# Patient Record
Sex: FEMALE | Race: WHITE
[De-identification: names, ages, dates, MRNs, and addresses within clinical notes are randomized per-mention and may not be internally consistent; named-entity substitution may affect disease eponyms.]

---

## 2019-04-15 NOTE — PREOPHP
DATE OF ADMISSION: 04/16/2019

 

HISTORY OF PRESENT ILLNESS:  This 58-year-old patient is admitted for elective cataract surgery of th
e left eye.  The patient has complained of decreased vision in the left eye for the past years' time 
without prior history of eye disease or injury.  The patient does have a history of insulin-dependent
 diabetes mellitus for many years, as well as being treated for systemic hypertension.

 

CURRENT MEDICATIONS INCLUDE:

1.  Low dose aspirin (discontinued 1 week prior to surgery.

2.  Hydrochlorothiazide.

3.  Janumet.

4.  Lipitor.

5.  Losartan.

6.  Novolin.

 

ALLERGIES:  There are no known allergies to medication.

 

PHYSICAL EXAMINATION:  A best corrected visual acuity 20/50 in the right eye and 20/200 in the left e
ye.  Slit lamp examination reveals trace posterior subcapsular cataract in the right eye and an anter
ior cortical and dense posterior subcapsular cataract in the left eye.  Applanation tonometry is 15 m
mHg.  Examination of the retina does not reveal the presence of any diabetic retinopathy.

 

DIAGNOSIS:  Cortical and posterior subcapsular cataract, left eye.

 

PLAN:  Cataract extraction with lens implant, left eye.  The risks and alternatives to the surgery ha
ve been discussed with the patient as well as the hope for improvement of visual acuity leading to gr
eater ability to perform activities of daily living.  The patient understands this and agrees to proc
eed with surgery.

 

 

Dictated By: ROXANN DIAL/FERNANDO

DD:    04/15/2019 12:24:13

DT:    04/15/2019 12:51:52

Conf#: [id number]

DID#:  [id number]

## 2019-04-16 ENCOUNTER — HOSPITAL ENCOUNTER (OUTPATIENT)
Dept: HOSPITAL 91 - SDS | Age: 59
Discharge: HOME | End: 2019-04-16
Payer: COMMERCIAL

## 2019-04-16 ENCOUNTER — HOSPITAL ENCOUNTER (OUTPATIENT)
Dept: HOSPITAL 10 - SDS | Age: 59
Discharge: HOME | End: 2019-04-16
Attending: OPHTHALMOLOGY
Payer: COMMERCIAL

## 2019-04-16 VITALS — SYSTOLIC BLOOD PRESSURE: 136 MMHG | RESPIRATION RATE: 16 BRPM | DIASTOLIC BLOOD PRESSURE: 63 MMHG | HEART RATE: 84 BPM

## 2019-04-16 VITALS — HEART RATE: 80 BPM | SYSTOLIC BLOOD PRESSURE: 133 MMHG | RESPIRATION RATE: 21 BRPM | DIASTOLIC BLOOD PRESSURE: 62 MMHG

## 2019-04-16 VITALS — SYSTOLIC BLOOD PRESSURE: 121 MMHG | RESPIRATION RATE: 19 BRPM | HEART RATE: 80 BPM | DIASTOLIC BLOOD PRESSURE: 59 MMHG

## 2019-04-16 VITALS — SYSTOLIC BLOOD PRESSURE: 124 MMHG | RESPIRATION RATE: 22 BRPM | DIASTOLIC BLOOD PRESSURE: 62 MMHG | HEART RATE: 82 BPM

## 2019-04-16 VITALS — DIASTOLIC BLOOD PRESSURE: 56 MMHG | RESPIRATION RATE: 24 BRPM | HEART RATE: 80 BPM | SYSTOLIC BLOOD PRESSURE: 119 MMHG

## 2019-04-16 VITALS — RESPIRATION RATE: 20 BRPM | HEART RATE: 80 BPM | SYSTOLIC BLOOD PRESSURE: 120 MMHG | DIASTOLIC BLOOD PRESSURE: 61 MMHG

## 2019-04-16 VITALS
BODY MASS INDEX: 32.54 KG/M2 | WEIGHT: 176.81 LBS | HEIGHT: 62 IN | BODY MASS INDEX: 32.54 KG/M2 | HEIGHT: 62 IN | WEIGHT: 176.81 LBS

## 2019-04-16 VITALS — HEART RATE: 81 BPM | SYSTOLIC BLOOD PRESSURE: 135 MMHG | DIASTOLIC BLOOD PRESSURE: 60 MMHG | RESPIRATION RATE: 18 BRPM

## 2019-04-16 VITALS — SYSTOLIC BLOOD PRESSURE: 126 MMHG | RESPIRATION RATE: 16 BRPM | DIASTOLIC BLOOD PRESSURE: 59 MMHG | HEART RATE: 82 BPM

## 2019-04-16 VITALS — HEART RATE: 80 BPM | SYSTOLIC BLOOD PRESSURE: 147 MMHG | DIASTOLIC BLOOD PRESSURE: 79 MMHG | RESPIRATION RATE: 16 BRPM

## 2019-04-16 VITALS — SYSTOLIC BLOOD PRESSURE: 118 MMHG | RESPIRATION RATE: 29 BRPM | DIASTOLIC BLOOD PRESSURE: 59 MMHG | HEART RATE: 82 BPM

## 2019-04-16 DIAGNOSIS — H25.042: Primary | ICD-10-CM

## 2019-04-16 DIAGNOSIS — Z79.82: ICD-10-CM

## 2019-04-16 DIAGNOSIS — Z79.84: ICD-10-CM

## 2019-04-16 DIAGNOSIS — E11.9: ICD-10-CM

## 2019-04-16 DIAGNOSIS — Z79.4: ICD-10-CM

## 2019-04-16 DIAGNOSIS — I10: ICD-10-CM

## 2019-04-16 PROCEDURE — 82962 GLUCOSE BLOOD TEST: CPT

## 2019-04-16 PROCEDURE — 66984 XCAPSL CTRC RMVL W/O ECP: CPT

## 2019-04-16 PROCEDURE — V2632 POST CHMBR INTRAOCULAR LENS: HCPCS

## 2019-04-16 RX ADMIN — DEXAMETHASONE SODIUM PHOSPHATE 1 MG: 4 INJECTION, SOLUTION INTRAMUSCULAR; INTRAVENOUS at 08:05

## 2019-04-16 RX ADMIN — LIDOCAINE HYDROCHLORIDE 1 ML: 40 INJECTION, SOLUTION RETROBULBAR; TOPICAL at 08:06

## 2019-04-16 RX ADMIN — CYCLOPENTOLATE HYDROCHLORIDE AND PHENYLEPHRINE HYDROCHLORIDE 1 DROP: 2; 10 SOLUTION/ DROPS OPHTHALMIC at 06:13

## 2019-04-16 RX ADMIN — THIAMINE HYDROCHLORIDE 1 MLS/HR: 100 INJECTION, SOLUTION INTRAMUSCULAR; INTRAVENOUS at 06:19

## 2019-04-16 RX ADMIN — DICLOFENAC SODIUM 1 DROP: 1 SOLUTION OPHTHALMIC at 06:18

## 2019-04-16 RX ADMIN — CARBACHOL 1 ML: 0.1 SOLUTION OPHTHALMIC at 08:05

## 2019-04-16 RX ADMIN — TETRACAINE HYDROCHLORIDE 1 DROP: 5 SOLUTION OPHTHALMIC at 08:06

## 2019-04-16 RX ADMIN — MOXIFLOXACIN HYDROCHLORIDE 1 DROP: 5 SOLUTION/ DROPS OPHTHALMIC at 06:16

## 2019-04-16 RX ADMIN — TROPICAMIDE 1 DROP: 10 SOLUTION/ DROPS OPHTHALMIC at 06:15

## 2019-04-16 RX ADMIN — Medication 1 EA: at 08:51

## 2019-04-16 NOTE — PAC
Date/Time of Note


Date/Time of Note


DATE: 4/16/19 


TIME: 08:55





Post-Anesthesia Notes


Post-Anesthesia Note


Last documented vital signs





Vital Signs


  Date      Temp  Pulse  Resp  B/P (MAP)   Pulse Ox  O2          O2 Flow    FiO2


Time                                                 Delivery    Rate


   4/16/19  98.3


     08:49


   4/16/19           80    20      120/61        96  Room Air


     08:31                           (80)





Activity:  WNL


Respiratory function:  WNL


Cardiovascular function:  WNL


Mental status:  Baseline


Pain reasonably controlled:  Yes


Hydration appropriate:  Yes


Nausea/Vomiting absent:  Yes











ROHIT CALLAHAN                  Apr 16, 2019 08:55

## 2019-04-16 NOTE — SIPON
Date/Time of Note


Date/Time of Note


DATE: 4/16/19 


TIME: 08:14





Operative Report


Preoperative Diagnosis


Posterior subcapsular cataract os


Postoperative Diagnosis


same


Operation/Procedure Performed


cataract extraction with lens implant os


Surgeon


roxann sabillon


First assist


none


Anesthesia:  MAC


Estimated blood loss:  none


Transfusion Required


   none


Specimen


none


Grafts/Implants


posterior chamber intraocular lens


Complications


none











ROXANN SABILLON MD            Apr 16, 2019 08:15

## 2019-04-16 NOTE — OPR
DATE OF OPERATION:  04/16/2019

 

 

PREOPERATIVE DIAGNOSIS:  Posterior subcapsular cataract.

 

POSTOPERATIVE DIAGNOSIS:  Posterior subcapsular cataract.

 

OPERATION PERFORMED:  Cataract extraction with lens implant, left eye.

 

SURGEON:  Roxann Chacon MD

 

ANESTHESIA:  Fly Gaines CRNA.  

 

PREOPERATIVE DIAGNOSIS:  Cataract, left eye.

 

POSTOPERATIVE DIAGNOSIS:  Cataract, left eye.

 

SURGEON:  Roxann Chacon MD

 

OPERATION:  Phacoemulsification with posterior chamber intraocular lens implant, left eye.

 

PROCEDURE:  The patient was brought to the operating room and placed on the table with an IV in place
 and the patient attached to an EKG monitor. Oxygen was given via face mask.

 

After some intravenous sedation was administered, local anesthesia was given using Xylocaine 2% with 
epinephrine, mixed with Marcaine 0.5%. This was given in a lid block and retrobulbar injection. The p
atient was then prepped and draped in the usual sterile manner.

 

A wire lid speculum was inserted between the lids of the left eye. A Superblade was used to enter the
 anterior chamber at the corneoscleral limbus at the 10:30 o'clock position. A separate incision was 
made using a 3.0-mm keratome which entered the corneoscleral junction at the 12 o'clock position. Thr
ough this 3-mm opening, an irrigating cystotome was introduced into the anterior chamber. The chamber
 was filled with Viscoat and an anterior capsulotomy was performed. Balanced salt solution was then u
sed for hydrodissection of the lens.  A phacoemulsification handpiece was then brought into the field
 and introduced into the anterior chamber. The lens nucleus was emulsified using a deep groove and cr
acking the nucleus into quadrants. Following this, each quadrant was aspirated and emulsified at the 
pupillary margin.

 

After this was completed, the irrigation/aspiration handpiece was brought to the field, introduced in
to the posterior chamber, and the lens cortical material was removed. When this was completed, additi
onal Viscoat was injected into the anterior and posterior chambers.

 

The 3-mm opening had its internal lips enlarged, and then the posterior chamber intraocular lens nicolle
uring 19.5 diopters ([employer] Model LI61AO) was then injected into the posterior c
hamber using the lens injector system. After the leading haptic was introduced into the capsular bag 
and the lens optic was present in the center of the eye, the injector was removed and the trailing ha
ptic was grasped with non-toothed forceps and introduced into the capsular fold superiorly. A Sinskey
 hook was then used to rotate the intraocular lens so that the lips were oriented in the horizontal m
eridian. One 10-0 nylon suture was placed across the wound.

 

Prior to tying, the irrigation/aspiration handpiece was reintroduced into the anterior chamber to rem
ove the Viscoat. Miochol was instilled to constrict the pupil, and then the 10-0 nylon suture was tie
d. The ends were cut short and then the knot was buried.

 

Then, 0.5 mL of dexamethasone and 0.5 mL of Ancef were injected into the sub-Tenon space in the infer
ior fornix.  Ciloxan drops were then placed on the surface of the eye. The speculum was removed and a
 patch was applied.

 

The patient then left the operating room in satisfactory condition.

 

 

Dictated By: ROXANN DIAL/FERNANDO

DD:    04/16/2019 08:10:23

DT:    04/16/2019 12:27:41

Conf#: [id number]

DID#:  [id number]

## 2019-04-16 NOTE — PREAC
Date/Time of Note


Date/Time of Note


DATE: 4/16/19 


TIME: 06:58





Anesthesia Eval and Record


Evaluation


Time Pre-Procedure Interview


DATE: 4/16/19 


TIME: 06:58


Age


58


Sex


female


NPO:  8 hrs


Preoperative diagnosis


left cataract


Planned procedure


left eye CEIOL implant





Past Medical History


Past Medical History:  Includes


Cardio:  HTN, Dyslipidemia


Endo:  Diabetes





Surgery & Anesthesia Issues


No known issue





Meds


Anticoagulation:  No


Beta Blocker within 24 hr:  No


Reason Beta Blocker not given:  Pt. not on B-Blocker


Reported Medications


Losartan Potassium* (Losartan Potassium*) 50 Mg Tablet, 50 MG PO DAILY, TAB


   4/16/19


Hydrochlorothiazide* (Hydrochlorothiazide*) 12.5 Mg Tablet, 12.5 MG PO DAILY, 


#30 TAB


   4/16/19


Aspirin* (Aspirin* EC) 81 Mg Tablet.dr, 81 MG PO DAILY, TAB


   4/16/19


Insulin Aspart* (Novolog Insulin Pen*) 100 Unit/Ml Soln, 5 UNIT SC TIDM A, EA


   4/16/19


Insulin Glargine,Hum.rec.anlog (Basaglar Kwikpen U-100) 100 Unit/1 Ml Ins


uln.pen, 10 UNIT SC QHS, EA


   4/16/19


[Lantus 50 Daily]   No Conflict Check


   12/1/17


[Novolin 5U Daily]   No Conflict Check


   12/1/17


[Lisinopril Daily]   No Conflict Check


   12/1/17


[Metformin Daily]   No Conflict Check


   12/1/17





Current Medications


Diclofenac Sodium (Voltaren 0.1%) 1 drop Q5 MIN X 3 OPER  Last administered on 


4/16/19at 06:18; Admin Dose 1 DROP;  Start 4/16/19 at 06:00


Tropicamide (Mydriacyl 1%) 1 drop Q5 MIN X3 OPER  Last administered on 4/16/19at


06:15; Admin Dose 1 DROP;  Start 4/16/19 at 06:00


Moxifloxacin HCl (Vigamox) 1 drop Q5 MIN X 3 OPER  Last administered on 


4/16/19at 06:16; Admin Dose 1 DROP;  Start 4/16/19 at 06:00


Cyclopentolate/ Phenylephrine (Cyclomydril Oph 2 ml) 1 drop Q5 MIN X 3 OPER  


Last administered on 4/16/19at 06:13; Admin Dose 1 DROP;  Start 4/16/19 at 06:00


Sodium Chloride 1,000 ml @  25 mls/hr Q24H IV  Last administered on 4/16/19at 


06:19; Admin Dose 25 MLS/HR;  Start 4/16/19 at 06:00


Meds reviewed:  Yes





Allergies


Coded Allergies:  


     No Known Drug Allergies (Verified  Allergy, Unknown, 4/16/19)


Allergies Reviewed:  Yes





Labs/Studies


Labs Reviewed:  Reviewed by anesthesiologist


Pregnancy test:  N/A


Studies:  ECG, CXR





Pre-procedure Exam


Last vitals





Vital Signs


  Date      Temp  Pulse  Resp  B/P (MAP)   Pulse Ox  O2          O2 Flow    FiO2


Time                                                 Delivery    Rate


   4/16/19  96.4     80    16      147/79        97  Room Air


     06:34                          (101)





Airway:  Adequate mouth opening, Adequate thyromental dist


Mallampati:  Mallampati II


Teeth:  Normal


Lung:  Normal


Heart:  Normal





ASA Physical Status


ASA physical status:  2


Emergency:  None





Planned Anesthetic


General/MAC:  MAC





Planned Pain Management


Parenteral pain med, Local by surgeon





Pre-operative Attestations


Prior to commencing anesthesia and surgery, the patient was re-evaluated, there 


was verification of:


*The patient's identity


*The results of appropriate recent lab work and preoperative vital signs


*The above evaluation not changing prior to induction


*Anesthetic plan, risk benefits, alternative and complications discussed with 


patient/family; questions answered; patient/family understands, accepts and 


wishes to proceed.











ROHIT CALLAHAN                  Apr 16, 2019 06:59

## 2019-06-03 NOTE — PREOPHP
DATE OF ADMISSION: 06/04/2019

 

HISTORY OF PRESENT ILLNESS:  This 58-year-old patient is admitted for elective cataract surgery of th
e right eye.  The patient previously underwent cataract surgery on the left eye 2 months prior with e
xcellent visual recovery.  The patient has a history of insulin-dependent diabetes mellitus for many 
years as well as being treated for systemic hypertension.

 

CURRENT MEDICATIONS:  Include:

1.  Low dose aspirin, which was discontinued 1 week prior to surgery.

2.  Basaglar.

3.  Hydrochlorothiazide.

4.  Janumet.

5.  Losartan.

6.  Novolin insulin.

 

ALLERGIES:  THE THERE ARE NO KNOWN ALLERGIES.

 

PHYSICAL EXAMINATION:  The visual acuity with correction is 20/70 in the right eye and 20/40 in the l
eft eye.  Slit lamp examination reveals posterior subcapsular cataract in the right eye and a posteri
or chamber intraocular lens in the left eye.  Applanation tonometry is 15 mmHg in the right eye.  The
re is a posterior chamber intraocular lens in appropriate position in the left eye.  Examination of t
he retina does not reveal the presence of any diabetic retinopathy.

 

DIAGNOSIS:  Posterior subcapsular cataract, right eye.

 

PLAN:  Cataract extraction with lens implant, right eye.  The risks and alternatives to the surgery h
ave been discussed with the patient as well as the hope for improvement of visual acuity leading to g
reater ability to perform activities of daily living.  The patient understands this and agrees to pro
ceed with surgery.

 

 

Dictated By: ROXANN DIAL/FERNANDO

DD:    06/03/2019 12:58:08

DT:    06/03/2019 14:52:24

Conf#: [id number]

DID#:  [id number]

## 2019-06-04 ENCOUNTER — HOSPITAL ENCOUNTER (OUTPATIENT)
Dept: HOSPITAL 91 - SDS | Age: 59
Discharge: HOME | End: 2019-06-04
Payer: COMMERCIAL

## 2019-06-04 ENCOUNTER — HOSPITAL ENCOUNTER (OUTPATIENT)
Dept: HOSPITAL 10 - SDS | Age: 59
Discharge: HOME | End: 2019-06-04
Attending: OPHTHALMOLOGY
Payer: COMMERCIAL

## 2019-06-04 VITALS — SYSTOLIC BLOOD PRESSURE: 134 MMHG | RESPIRATION RATE: 14 BRPM | HEART RATE: 70 BPM | DIASTOLIC BLOOD PRESSURE: 74 MMHG

## 2019-06-04 VITALS
HEIGHT: 60 IN | BODY MASS INDEX: 34.5 KG/M2 | WEIGHT: 175.71 LBS | WEIGHT: 175.71 LBS | HEIGHT: 60 IN | BODY MASS INDEX: 34.5 KG/M2

## 2019-06-04 VITALS — HEART RATE: 80 BPM | SYSTOLIC BLOOD PRESSURE: 127 MMHG | RESPIRATION RATE: 16 BRPM | DIASTOLIC BLOOD PRESSURE: 74 MMHG

## 2019-06-04 VITALS — HEART RATE: 78 BPM | SYSTOLIC BLOOD PRESSURE: 139 MMHG | RESPIRATION RATE: 18 BRPM | DIASTOLIC BLOOD PRESSURE: 78 MMHG

## 2019-06-04 VITALS — HEART RATE: 72 BPM | RESPIRATION RATE: 14 BRPM | SYSTOLIC BLOOD PRESSURE: 117 MMHG | DIASTOLIC BLOOD PRESSURE: 72 MMHG

## 2019-06-04 VITALS — HEART RATE: 74 BPM | SYSTOLIC BLOOD PRESSURE: 118 MMHG | RESPIRATION RATE: 13 BRPM | DIASTOLIC BLOOD PRESSURE: 71 MMHG

## 2019-06-04 VITALS — SYSTOLIC BLOOD PRESSURE: 139 MMHG | HEART RATE: 77 BPM | DIASTOLIC BLOOD PRESSURE: 67 MMHG | RESPIRATION RATE: 16 BRPM

## 2019-06-04 VITALS — HEART RATE: 74 BPM | SYSTOLIC BLOOD PRESSURE: 126 MMHG | DIASTOLIC BLOOD PRESSURE: 75 MMHG | RESPIRATION RATE: 14 BRPM

## 2019-06-04 VITALS — DIASTOLIC BLOOD PRESSURE: 63 MMHG | SYSTOLIC BLOOD PRESSURE: 137 MMHG | RESPIRATION RATE: 16 BRPM | HEART RATE: 77 BPM

## 2019-06-04 DIAGNOSIS — Z79.4: ICD-10-CM

## 2019-06-04 DIAGNOSIS — E78.5: ICD-10-CM

## 2019-06-04 DIAGNOSIS — H25.11: Primary | ICD-10-CM

## 2019-06-04 DIAGNOSIS — E11.9: ICD-10-CM

## 2019-06-04 DIAGNOSIS — Z79.82: ICD-10-CM

## 2019-06-04 DIAGNOSIS — I10: ICD-10-CM

## 2019-06-04 PROCEDURE — 66984 XCAPSL CTRC RMVL W/O ECP: CPT

## 2019-06-04 PROCEDURE — 93005 ELECTROCARDIOGRAM TRACING: CPT

## 2019-06-04 PROCEDURE — 82962 GLUCOSE BLOOD TEST: CPT

## 2019-06-04 PROCEDURE — V2632 POST CHMBR INTRAOCULAR LENS: HCPCS

## 2019-06-04 RX ADMIN — MOXIFLOXACIN HYDROCHLORIDE 1 DROP: 5 SOLUTION/ DROPS OPHTHALMIC at 07:43

## 2019-06-04 RX ADMIN — DEXAMETHASONE SODIUM PHOSPHATE 1 MG: 4 INJECTION, SOLUTION INTRAMUSCULAR; INTRAVENOUS at 08:19

## 2019-06-04 RX ADMIN — CYCLOPENTOLATE HYDROCHLORIDE AND PHENYLEPHRINE HYDROCHLORIDE 1 DROP: 2; 10 SOLUTION/ DROPS OPHTHALMIC at 07:42

## 2019-06-04 RX ADMIN — TROPICAMIDE 1 DROP: 10 SOLUTION/ DROPS OPHTHALMIC at 07:43

## 2019-06-04 RX ADMIN — CARBACHOL 1 ML: 0.1 SOLUTION OPHTHALMIC at 08:19

## 2019-06-04 RX ADMIN — CEFAZOLIN 1 GM: 1 INJECTION, POWDER, FOR SOLUTION INTRAMUSCULAR; INTRAVENOUS at 08:19

## 2019-06-04 RX ADMIN — DICLOFENAC SODIUM 1 DROP: 1 SOLUTION OPHTHALMIC at 07:43

## 2019-06-04 RX ADMIN — THIAMINE HYDROCHLORIDE 1 MLS/HR: 100 INJECTION, SOLUTION INTRAMUSCULAR; INTRAVENOUS at 07:43

## 2019-06-04 NOTE — OPR
DATE OF OPERATION:  06/04/2019

 

 

PREOPERATIVE DIAGNOSES:  Nuclear sclerotic, core and cortical cataract, right 

eye.

 

POSTOPERATIVE DIAGNOSIS:  Nuclear sclerotic, core and cortical cataract, right 

eye.

 

OPERATION PERFORMED:  Cataract extraction with lens implant, right eye.

 

SURGEON:  Roxann Sabillon M.D.

 

ANESTHESIA:  Local standby.

 

PROCEDURE:  The patient was brought to the operating room and placed on the 

table with an IV in place and the patient attached to an EKG monitor.  Oxygen 

was given via face mask.

 

After some intravenous sedation was administered, local anesthesia was given 

using Xylocaine 2% with epinephrine, mixed with Marcaine 0.5%.  This was given 

in a lid block and retrobulbar injection.  The patient was then prepped and 

draped in the usual sterile manner.

 

A wire lid speculum was inserted between the lids of the right eye.  A 

Superblade was used to enter the anterior chamber at the corneoscleral limbus at

the 10:30 o'clock position.  A separate incision was made using a 3.0-mm 

keratome which entered the corneoscleral junction at the 12 o'clock position.  

Through this 3-mm opening, an irrigating cystotome was introduced into the 

anterior chamber.  The chamber was filled with Viscoat and an anterior 

capsulotomy was performed.  Balanced salt solution was then used for 

hydrodissection of the lens.  A phacoemulsification handpiece was then brought 

into the field and introduced into the anterior chamber.  The lens nucleus was 

emulsified using a deep groove and cracking the nucleus into quadrants.  

Following this, each quadrant was aspirated and emulsified at the pupillary 

margin.

 

After this was completed, the irrigation/aspiration handpiece was brought to the

field, introduced into the posterior chamber, and the lens cortical material was

removed.  When this was completed, additional Viscoat was injected into the 

anterior and posterior chambers. As an attempt was being made to remove the sub-

incisional cortex using bimanual technique of the lens.  The posterior lens 

capsule tore and some formed vitreous presented at the wound.  A limited 

anterior vitrectomy was performed until no vitreous was present and sufficient 

capsular support was noted to be present. 

 

The 3-mm opening had its internal lips enlarged, and then the posterior chamber 

intraocular lens measuring 19.0 diopters ([employer] Model 

LI61AO) was then injected into the posterior chamber using the lens injector 

system.  After the leading haptic was introduced into the capsular bag and the 

lens optic was present in the center of the eye, the injector was removed and 

the trailing haptic was grasped with non-toothed forceps and introduced into the

capsular fold superiorly.  A Sinskey hook was then used to rotate the 

intraocular lens so that the lips were oriented in the horizontal meridian.  One

10-0 nylon suture was placed across the wound.

 

Prior to tying, the irrigation/aspiration handpiece was reintroduced into the 

anterior chamber to remove the Viscoat.  Miochol was instilled to constrict the 

pupil, and then the 10-0 nylon suture was tied.  The ends were cut short and 

then the knot was buried.

 

Then, 0.5 mL of dexamethasone and 0.5 mL of Ancef were injected into the sub-

Tenon space in the inferior fornix.  Ciloxan drops were then placed on the 

surface of the eye.  The speculum was removed and a patch was applied.

 

The patient then left the operating room in satisfactory condition.

 

 

Dictated By: ROXANN DIAL/FERNANDO

DD:    06/04/2019 09:18:02

DT:    06/04/2019 10:21:28

Conf#: [id number]

DID#:  [id number]

CC: ROXANN SABILLON MD;*EndCC*

MTDD

## 2019-06-04 NOTE — SIPON
Date/Time of Note


Date/Time of Note


DATE: 6/4/19 


TIME: 09:23





Operative Report


Preoperative Diagnosis


cortical and nuclear sclerotic cataract od


Postoperative Diagnosis


same


Operation/Procedure Performed


cataract extraction with lens implant od


Surgeon


roxann sabillon


First assist


none


Anesthesia:  MAC


Estimated blood loss:  none


Transfusion Required


   none


Specimen


none


Grafts/Implants


posterior chamber lens implant


Complications


none











ROXANN SABILLON MD             Jun 4, 2019 09:25

## 2019-06-04 NOTE — PREAC
Date/Time of Note


Date/Time of Note


DATE: 6/4/19 


TIME: 08:31





Anesthesia Eval and Record


Evaluation


Time Pre-Procedure Interview


DATE: 6/4/19 


TIME: 08:31


Age


58


Sex


female


NPO:  8 hrs


Preoperative diagnosis


Rt eye cataract


Planned procedure


Rt eye CE IOL implant





Past Medical History


Past Medical History:  Includes


Cardio:  HTN, Dyslipidemia


Endo:  Diabetes


GI:  Morbid obesity





Surgery & Anesthesia Issues


No known issue





Meds


Anticoagulation:  Yes


Beta Blocker within 24 hr:  No


Reason Beta Blocker not given:  Pt. not on B-Blocker


Reported Medications


Atorvastatin* (Atorvastatin*) 40 Mg Tablet, 40 MG PO QHS, #30 TAB


   4/16/19


Sitagliptin Phos/Metformin HCl (Janumet 50-1,000 mg Tablet) 1 Each Tablet, 1 


EACH PO DAILY, TAB


   4/16/19


Losartan Potassium* (Losartan Potassium*) 50 Mg Tablet, 50 MG PO DAILY, TAB


   4/16/19


Hydrochlorothiazide* (Hydrochlorothiazide*) 12.5 Mg Tablet, 12.5 MG PO DAILY, 


#30 TAB


   4/16/19


Aspirin* (Aspirin* EC) 81 Mg Tablet.dr, 81 MG PO DAILY, TAB


   4/16/19


Insulin Aspart* (Novolog Insulin Pen*) 100 Unit/Ml Soln, 5 UNIT SC TIDM A, EA


   4/16/19


Insulin Glargine,Hum.rec.anlog (Basaglar Kwikpen U-100) 100 Unit/1 Ml 


Insuln.pen, 10 UNIT SC QHS, EA


   4/16/19





Current Medications


Diclofenac Sodium (Voltaren 0.1%) 1 drop Q5 MIN X 3 OPER  Last administered on 


6/4/19at 07:43; Admin Dose 1 DROP;  Start 6/4/19 at 07:00


Tropicamide (Mydriacyl 1%) 1 drop Q5 MIN X3 OPER  Last administered on 6/4/19at 


07:43; Admin Dose 1 DROP;  Start 6/4/19 at 07:00


Moxifloxacin HCl (Vigamox) 1 drop Q5 MIN X 3 OPER  Last administered on 6/4/19at


07:43; Admin Dose 1 DROP;  Start 6/4/19 at 07:00


Cyclopentolate/ Phenylephrine (Cyclomydril Oph 2 ml) 1 drop Q5 MIN X 3 OPER  


Last administered on 6/4/19at 07:42; Admin Dose 1 DROP;  Start 6/4/19 at 07:00


Sodium Chloride 1,000 ml @  25 mls/hr Q24H IV  Last administered on 6/4/19at 


07:43; Admin Dose 25 MLS/HR;  Start 6/4/19 at 07:00


Meds reviewed:  Yes





Allergies


Coded Allergies:  


     No Known Drug Allergies (Verified  Allergy, Unknown, 6/4/19)


Allergies Reviewed:  Yes





Labs/Studies


Labs Reviewed:  Reviewed by anesthesiologist


Pregnancy test:  N/A


Studies:  ECG





Pre-procedure Exam


Last vitals





Vital Signs


  Date      Temp  Pulse  Resp  B/P (MAP)   Pulse Ox  O2          O2 Flow    FiO2


Time                                                 Delivery    Rate


    6/4/19  98.0     77    16      139/67        98  Room Air


     06:55                           (91)





Airway:  Adequate mouth opening, Adequate thyromental dist


Mallampati:  Mallampati II


Teeth:  Normal


Lung:  Normal


Heart:  Normal





ASA Physical Status


ASA physical status:  3


Emergency:  None





Planned Anesthetic


General/MAC:  MAC





Planned Pain Management


Single shot nerve block, Parenteral pain med





Pre-operative Attestations


Prior to commencing anesthesia and surgery, the patient was re-evaluated, there 


was verification of:


*The patient's identity


*The results of appropriate recent lab work and preoperative vital signs


*The above evaluation not changing prior to induction


*Anesthetic plan, risk benefits, alternative and complications discussed with 


patient/family; questions answered; patient/family understands, accepts and 


wishes to proceed.











SPENCER GIRALDO MD               Jun 4, 2019 08:33

## 2019-06-04 NOTE — PAC
Date/Time of Note


Date/Time of Note


DATE: 6/4/19 


TIME: 09:26





Post-Anesthesia Notes


Post-Anesthesia Note


Last documented vital signs





Vital Signs


  Date      Temp  Pulse  Resp  B/P (MAP)   Pulse Ox  O2          O2 Flow    FiO2


Time                                                 Delivery    Rate


    6/4/19  98.0     77    16      139/67        98  Room Air


     06:55                           (91)





Activity:  WNL


Respiratory function:  WNL


Cardiovascular function:  WNL


Mental status:  Baseline


Pain reasonably controlled:  Yes


Hydration appropriate:  Yes


Nausea/Vomiting absent:  Yes


Comments


BP:134/67, P:78, Spo2:100%, T:98,8











SPENCER GIRALDO MD               Jun 4, 2019 09:27